# Patient Record
Sex: FEMALE | Race: BLACK OR AFRICAN AMERICAN
[De-identification: names, ages, dates, MRNs, and addresses within clinical notes are randomized per-mention and may not be internally consistent; named-entity substitution may affect disease eponyms.]

---

## 2018-04-15 ENCOUNTER — HOSPITAL ENCOUNTER (EMERGENCY)
Dept: HOSPITAL 17 - NEPD | Age: 23
LOS: 1 days | Discharge: HOME | End: 2018-04-16
Payer: COMMERCIAL

## 2018-04-15 VITALS
OXYGEN SATURATION: 98 % | DIASTOLIC BLOOD PRESSURE: 66 MMHG | TEMPERATURE: 98.7 F | RESPIRATION RATE: 16 BRPM | SYSTOLIC BLOOD PRESSURE: 138 MMHG | HEART RATE: 102 BPM

## 2018-04-15 VITALS
RESPIRATION RATE: 16 BRPM | SYSTOLIC BLOOD PRESSURE: 105 MMHG | HEART RATE: 67 BPM | TEMPERATURE: 98.7 F | OXYGEN SATURATION: 99 % | DIASTOLIC BLOOD PRESSURE: 53 MMHG

## 2018-04-15 VITALS
SYSTOLIC BLOOD PRESSURE: 99 MMHG | HEART RATE: 72 BPM | OXYGEN SATURATION: 100 % | TEMPERATURE: 96.9 F | RESPIRATION RATE: 20 BRPM | DIASTOLIC BLOOD PRESSURE: 52 MMHG

## 2018-04-15 VITALS
HEART RATE: 59 BPM | SYSTOLIC BLOOD PRESSURE: 109 MMHG | DIASTOLIC BLOOD PRESSURE: 53 MMHG | RESPIRATION RATE: 18 BRPM | OXYGEN SATURATION: 97 % | TEMPERATURE: 98.2 F

## 2018-04-15 VITALS — WEIGHT: 165.9 LBS | HEIGHT: 67 IN | BODY MASS INDEX: 26.04 KG/M2

## 2018-04-15 DIAGNOSIS — R45.851: ICD-10-CM

## 2018-04-15 DIAGNOSIS — F43.25: Primary | ICD-10-CM

## 2018-04-15 LAB
ALBUMIN SERPL-MCNC: 3.9 GM/DL (ref 3.4–5)
ALP SERPL-CCNC: 114 U/L (ref 45–117)
ALT SERPL-CCNC: 17 U/L (ref 10–53)
AST SERPL-CCNC: 14 U/L (ref 15–37)
BASOPHILS # BLD AUTO: 0 TH/MM3 (ref 0–0.2)
BASOPHILS NFR BLD: 0.5 % (ref 0–2)
BILIRUB SERPL-MCNC: 0.8 MG/DL (ref 0.2–1)
BUN SERPL-MCNC: 13 MG/DL (ref 7–18)
CALCIUM SERPL-MCNC: 9.2 MG/DL (ref 8.5–10.1)
CHLORIDE SERPL-SCNC: 110 MEQ/L (ref 98–107)
COLOR UR: YELLOW
CREAT SERPL-MCNC: 0.77 MG/DL (ref 0.5–1)
EOSINOPHIL # BLD: 0 TH/MM3 (ref 0–0.4)
EOSINOPHIL NFR BLD: 0.1 % (ref 0–4)
ERYTHROCYTE [DISTWIDTH] IN BLOOD BY AUTOMATED COUNT: 13.3 % (ref 11.6–17.2)
GFR SERPLBLD BASED ON 1.73 SQ M-ARVRAT: 95 ML/MIN (ref 89–?)
GLUCOSE SERPL-MCNC: 132 MG/DL (ref 74–106)
GLUCOSE UR STRIP-MCNC: (no result) MG/DL
HCO3 BLD-SCNC: 24.7 MEQ/L (ref 21–32)
HCT VFR BLD CALC: 39.7 % (ref 35–46)
HGB BLD-MCNC: 13.9 GM/DL (ref 11.6–15.3)
HGB UR QL STRIP: (no result)
HYALINE CASTS #/AREA URNS LPF: 3 /LPF
KETONES UR STRIP-MCNC: (no result) MG/DL
LYMPHOCYTES # BLD AUTO: 0.8 TH/MM3 (ref 1–4.8)
LYMPHOCYTES NFR BLD AUTO: 10 % (ref 9–44)
MCH RBC QN AUTO: 30.8 PG (ref 27–34)
MCHC RBC AUTO-ENTMCNC: 35.1 % (ref 32–36)
MCV RBC AUTO: 87.6 FL (ref 80–100)
MONOCYTE #: 0.4 TH/MM3 (ref 0–0.9)
MONOCYTES NFR BLD: 4.9 % (ref 0–8)
MUCOUS THREADS #/AREA URNS LPF: (no result) /LPF
NEUTROPHILS # BLD AUTO: 6.9 TH/MM3 (ref 1.8–7.7)
NEUTROPHILS NFR BLD AUTO: 84.5 % (ref 16–70)
NITRITE UR QL STRIP: (no result)
PLATELET # BLD: 212 TH/MM3 (ref 150–450)
PMV BLD AUTO: 9.3 FL (ref 7–11)
PROT SERPL-MCNC: 7.7 GM/DL (ref 6.4–8.2)
RBC # BLD AUTO: 4.53 MIL/MM3 (ref 4–5.3)
SODIUM SERPL-SCNC: 141 MEQ/L (ref 136–145)
SP GR UR STRIP: 1.03 (ref 1–1.03)
SQUAMOUS #/AREA URNS HPF: 14 /HPF (ref 0–5)
URINE LEUKOCYTE ESTERASE: (no result)
WBC # BLD AUTO: 8.2 TH/MM3 (ref 4–11)

## 2018-04-15 PROCEDURE — 85025 COMPLETE CBC W/AUTO DIFF WBC: CPT

## 2018-04-15 PROCEDURE — 81001 URINALYSIS AUTO W/SCOPE: CPT

## 2018-04-15 PROCEDURE — 84703 CHORIONIC GONADOTROPIN ASSAY: CPT

## 2018-04-15 PROCEDURE — 99284 EMERGENCY DEPT VISIT MOD MDM: CPT

## 2018-04-15 PROCEDURE — 80307 DRUG TEST PRSMV CHEM ANLYZR: CPT

## 2018-04-15 PROCEDURE — 80053 COMPREHEN METABOLIC PANEL: CPT
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
## 2018-04-16 VITALS
SYSTOLIC BLOOD PRESSURE: 96 MMHG | TEMPERATURE: 98.7 F | DIASTOLIC BLOOD PRESSURE: 53 MMHG | HEART RATE: 55 BPM | RESPIRATION RATE: 18 BRPM | OXYGEN SATURATION: 100 %

## 2018-04-16 VITALS
TEMPERATURE: 98.7 F | DIASTOLIC BLOOD PRESSURE: 53 MMHG | SYSTOLIC BLOOD PRESSURE: 96 MMHG | RESPIRATION RATE: 18 BRPM | HEART RATE: 55 BPM | OXYGEN SATURATION: 100 %

## 2018-04-16 VITALS
RESPIRATION RATE: 16 BRPM | HEART RATE: 56 BPM | SYSTOLIC BLOOD PRESSURE: 99 MMHG | OXYGEN SATURATION: 96 % | TEMPERATURE: 98.7 F | DIASTOLIC BLOOD PRESSURE: 50 MMHG

## 2018-04-16 NOTE — PD
Physical Exam


Time Seen by Provider:  16:30


Narrative


Dr. Fuentes has evaluated patient, lifted the Baker act and cleared the 

patient for discharge.





Data


Data


Last Documented VS





Vital Signs








  Date Time  Temp Pulse Resp B/P (MAP) Pulse Ox O2 Delivery O2 Flow Rate FiO2


 


4/16/18 09:26 98.7 55 18 96/53 (67) 100   


 


4/16/18 06:48      Room Air  








Orders





 Orders


Complete Blood Count With Diff (4/15/18 07:37)


Comprehensive Metabolic Panel (4/15/18 07:37)


Urinalysis - C+S If Indicated (4/15/18 07:37)


Ed Urine Pregnancytest Poc (4/15/18 07:37)


Psych Screen (4/15/18 07:37)


Drug Screen, Random Urine (4/15/18 07:37)


Diet Regular Basic (4/15/18 Breakfast)


Diet Regular Basic (4/15/18 Lunch)


Diet Regular Basic (4/15/18 Dinner)


Diet Regular Basic (4/16/18 Breakfast)


Diet Regular Basic (4/16/18 Lunch)





Labs





Laboratory Tests








Test


  4/15/18


07:53


 


White Blood Count 8.2 TH/MM3 


 


Red Blood Count 4.53 MIL/MM3 


 


Hemoglobin 13.9 GM/DL 


 


Hematocrit 39.7 % 


 


Mean Corpuscular Volume 87.6 FL 


 


Mean Corpuscular Hemoglobin 30.8 PG 


 


Mean Corpuscular Hemoglobin


Concent 35.1 % 


 


 


Red Cell Distribution Width 13.3 % 


 


Platelet Count 212 TH/MM3 


 


Mean Platelet Volume 9.3 FL 


 


Neutrophils (%) (Auto) 84.5 % 


 


Lymphocytes (%) (Auto) 10.0 % 


 


Monocytes (%) (Auto) 4.9 % 


 


Eosinophils (%) (Auto) 0.1 % 


 


Basophils (%) (Auto) 0.5 % 


 


Neutrophils # (Auto) 6.9 TH/MM3 


 


Lymphocytes # (Auto) 0.8 TH/MM3 


 


Monocytes # (Auto) 0.4 TH/MM3 


 


Eosinophils # (Auto) 0.0 TH/MM3 


 


Basophils # (Auto) 0.0 TH/MM3 


 


CBC Comment DIFF FINAL 


 


Differential Comment  


 


Urine Color YELLOW 


 


Urine Turbidity HAZY 


 


Urine pH 5.5 


 


Urine Specific Gravity 1.029 


 


Urine Protein 100 mg/dL 


 


Urine Glucose (UA) NEG mg/dL 


 


Urine Ketones TRACE mg/dL 


 


Urine Occult Blood MOD 


 


Urine Nitrite NEG 


 


Urine Bilirubin NEG 


 


Urine Urobilinogen 2.0 MG/DL 


 


Urine Leukocyte Esterase NEG 


 


Urine RBC 2 /hpf 


 


Urine WBC 2 /hpf 


 


Urine Squamous Epithelial


Cells 14 /hpf 


 


 


Urine Hyaline Casts 3 /lpf 


 


Urine Mucus MANY /lpf 


 


Microscopic Urinalysis Comment


  CULT NOT


INDICATED


 


Blood Urea Nitrogen 13 MG/DL 


 


Creatinine 0.77 MG/DL 


 


Random Glucose 132 MG/DL 


 


Total Protein 7.7 GM/DL 


 


Albumin 3.9 GM/DL 


 


Calcium Level 9.2 MG/DL 


 


Alkaline Phosphatase 114 U/L 


 


Aspartate Amino Transf


(AST/SGOT) 14 U/L 


 


 


Alanine Aminotransferase


(ALT/SGPT) 17 U/L 


 


 


Total Bilirubin 0.8 MG/DL 


 


Sodium Level 141 MEQ/L 


 


Potassium Level 3.9 MEQ/L 


 


Chloride Level 110 MEQ/L 


 


Carbon Dioxide Level 24.7 MEQ/L 


 


Anion Gap 6 MEQ/L 


 


Estimat Glomerular Filtration


Rate 95 ML/MIN 


 


 


Urine Opiates Screen NEG 


 


Urine Barbiturates Screen NEG 


 


Urine Amphetamines Screen NEG 


 


Urine Benzodiazepines Screen NEG 


 


Urine Cocaine Screen NEG 


 


Urine Cannabinoids Screen NEG 











MDM


Supervised Visit with JAMESON:  No


Narrative Course


Dr. Fuentes has evaluated patient, lifted the Baker act and cleared the 

patient for discharge.  Patient contracts safety.  Denies suicidal or homicidal 

ideations.  Patient will be provided community resource packet to [facility] for 

follow-up.  Has friends and family for support.  Patient was medically cleared 

by alternate provider prior to psych screening.  Patient has been evaluated by 

psychiatry and and is now cleared for discharge.


Diagnosis





 Primary Impression:  


 Adjustment disorder with mixed disturbance of emotions and conduct


Referrals:  


[facility] (Out patient)





[facility]





Primary Care Physician





Psychiatrist





[facility]


Patient Instructions:  General Instructions, Mood Disorders (ED)





***Additional Instruction:  


Contract safety to your self and others


Follow-up with psychiatry


Follow-up with primary care provider


Follow-up with Corey Mchugh


Return to the emergency department immediately with worsening of symptoms


***Med/Other Pt SpecificInfo:  No Change to Meds, No Meds Exist/No RX given


Scripts


No Active Prescriptions or Reported Meds


Disposition:  01 DISCHARGE HOME


Condition:  Stable











Arlen Pandya Apr 16, 2018 16:31
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
